# Patient Record
Sex: FEMALE | Race: WHITE | NOT HISPANIC OR LATINO | URBAN - METROPOLITAN AREA
[De-identification: names, ages, dates, MRNs, and addresses within clinical notes are randomized per-mention and may not be internally consistent; named-entity substitution may affect disease eponyms.]

---

## 2024-05-21 ENCOUNTER — LAB OUTSIDE AN ENCOUNTER (OUTPATIENT)
Dept: URBAN - METROPOLITAN AREA CLINIC 98 | Facility: CLINIC | Age: 44
End: 2024-05-21

## 2024-05-21 ENCOUNTER — OFFICE VISIT (OUTPATIENT)
Dept: URBAN - METROPOLITAN AREA CLINIC 98 | Facility: CLINIC | Age: 44
End: 2024-05-21

## 2024-05-21 ENCOUNTER — DASHBOARD ENCOUNTERS (OUTPATIENT)
Age: 44
End: 2024-05-21

## 2024-05-21 VITALS
HEART RATE: 65 BPM | WEIGHT: 212.8 LBS | TEMPERATURE: 97.1 F | DIASTOLIC BLOOD PRESSURE: 64 MMHG | BODY MASS INDEX: 35.45 KG/M2 | HEIGHT: 65 IN | SYSTOLIC BLOOD PRESSURE: 104 MMHG

## 2024-05-21 PROBLEM — 197321007: Status: ACTIVE | Noted: 2024-05-21

## 2024-05-21 PROBLEM — 162864005: Status: ACTIVE | Noted: 2024-05-21

## 2024-05-21 PROBLEM — 722596001: Status: ACTIVE | Noted: 2024-05-21

## 2024-05-21 RX ORDER — POLYETHYLENE GLYCOL 3350, SODIUM CHLORIDE, SODIUM BICARBONATE, POTASSIUM CHLORIDE 420; 11.2; 5.72; 1.48 G/4L; G/4L; G/4L; G/4L
4000 ML POWDER, FOR SOLUTION ORAL AS DIRECTED
Qty: 4000 ML | Refills: 0 | OUTPATIENT
Start: 2024-05-21 | End: 2024-05-22

## 2024-05-24 LAB
A/G RATIO: 1.9
ACTIN (SMOOTH MUSCLE) ANTIBODY: 4
AFP, SERUM, TUMOR MARKER: 9.6
ALBUMIN: 4.9
ALKALINE PHOSPHATASE: 52
ALT (SGPT): 21
ANA DIRECT: NEGATIVE
ANTINUCLEAR ANTIBODIES, IFA: NEGATIVE
AST (SGOT): 20
BASO (ABSOLUTE): 0.1
BASOS: 1
BILIRUBIN, TOTAL: 0.3
BUN/CREATININE RATIO: 17
BUN: 13
CALCIUM: 9.8
CARBON DIOXIDE, TOTAL: 19
CHLORIDE: 101
CHOLESTEROL, TOTAL: 253
COMMENT:: (no result)
CREATININE: 0.75
EGFR: 100
EOS (ABSOLUTE): 0.1
EOS: 1
FERRITIN, SERUM: 119
FIB-4 INDEX: 0.65
GLOBULIN, TOTAL: 2.6
GLUCOSE: 108
HBSAG SCREEN: NEGATIVE
HDL CHOLESTEROL: 57
HEMATOCRIT: 43.2
HEMATOLOGY COMMENTS:: (no result)
HEMOGLOBIN: 14.4
HEP A AB, IGM: NEGATIVE
HEP A AB, TOTAL: POSITIVE
HEPATITIS B SURF AB QUANT: <3.5
IMMATURE CELLS: (no result)
IMMATURE GRANS (ABS): 0
IMMATURE GRANULOCYTES: 0
IMMUNOGLOBULIN A, QN, SERUM: 196
IMMUNOGLOBULIN E, TOTAL: 17
IMMUNOGLOBULIN G, QN, SERUM: 1106
IMMUNOGLOBULIN M, QN, SERUM: 175
INR: 1
IRON BIND.CAP.(TIBC): 349
IRON SATURATION: 26
IRON: 92
LDL CHOL CALC (NIH): 151
LYMPHS (ABSOLUTE): 3.6
LYMPHS: 43
MCH: 31.6
MCHC: 33.3
MCV: 95
MITOCHONDRIAL (M2) ANTIBODY: <20
MONOCYTES(ABSOLUTE): 0.4
MONOCYTES: 5
NEUTROPHILS (ABSOLUTE): 4.3
NEUTROPHILS: 50
NRBC: (no result)
PLATELETS: 302
POTASSIUM: 4.1
PROTEIN, TOTAL: 7.5
PROTHROMBIN TIME: 10.1
RBC: 4.55
RDW: 12.9
SODIUM: 135
TRIGLYCERIDES: 249
UIBC: 257
VLDL CHOLESTEROL CAL: 45
WBC: 8.4

## 2024-06-02 ENCOUNTER — LAB OUTSIDE AN ENCOUNTER (OUTPATIENT)
Dept: URBAN - METROPOLITAN AREA CLINIC 98 | Facility: CLINIC | Age: 44
End: 2024-06-02

## 2024-06-02 ENCOUNTER — TELEPHONE ENCOUNTER (OUTPATIENT)
Dept: URBAN - METROPOLITAN AREA CLINIC 98 | Facility: CLINIC | Age: 44
End: 2024-06-02

## 2024-06-06 ENCOUNTER — LAB OUTSIDE AN ENCOUNTER (OUTPATIENT)
Dept: URBAN - METROPOLITAN AREA CLINIC 98 | Facility: CLINIC | Age: 44
End: 2024-06-06

## 2024-06-17 ENCOUNTER — TELEPHONE ENCOUNTER (OUTPATIENT)
Dept: URBAN - METROPOLITAN AREA CLINIC 98 | Facility: CLINIC | Age: 44
End: 2024-06-17

## 2024-07-17 ENCOUNTER — CLAIMS CREATED FROM THE CLAIM WINDOW (OUTPATIENT)
Dept: URBAN - METROPOLITAN AREA SURGERY CENTER 18 | Facility: SURGERY CENTER | Age: 44
End: 2024-07-17
Payer: COMMERCIAL

## 2024-07-17 DIAGNOSIS — K64.8 INTERNAL HEMORRHOIDS: ICD-10-CM

## 2024-07-17 DIAGNOSIS — Z12.11 COLON CANCER SCREENING: ICD-10-CM

## 2024-07-17 DIAGNOSIS — Z80.0 FAMILY HISTORY OF COLON CANCER: ICD-10-CM

## 2024-07-17 DIAGNOSIS — Z80.0 FAMILY HISTORY OF MALIGNANT NEOPLASM OF DIGESTIVE ORGANS: ICD-10-CM

## 2024-07-17 PROCEDURE — G0105 COLORECTAL SCRN; HI RISK IND: HCPCS | Performed by: INTERNAL MEDICINE

## 2024-07-17 PROCEDURE — 00811 ANES LWR INTST NDSC NOS: CPT | Performed by: NURSE ANESTHETIST, CERTIFIED REGISTERED

## 2024-08-19 ENCOUNTER — OFFICE VISIT (OUTPATIENT)
Dept: URBAN - METROPOLITAN AREA CLINIC 98 | Facility: CLINIC | Age: 44
End: 2024-08-19

## 2024-11-01 ENCOUNTER — OFFICE VISIT (OUTPATIENT)
Dept: URBAN - METROPOLITAN AREA CLINIC 98 | Facility: CLINIC | Age: 44
End: 2024-11-01
Payer: COMMERCIAL

## 2024-11-01 VITALS
DIASTOLIC BLOOD PRESSURE: 73 MMHG | SYSTOLIC BLOOD PRESSURE: 103 MMHG | HEART RATE: 69 BPM | BODY MASS INDEX: 34.16 KG/M2 | WEIGHT: 205 LBS | TEMPERATURE: 96.6 F | HEIGHT: 65 IN

## 2024-11-01 DIAGNOSIS — E66.09 OTHER OBESITY DUE TO EXCESS CALORIES: ICD-10-CM

## 2024-11-01 DIAGNOSIS — R16.0 HEPATOMEGALY: ICD-10-CM

## 2024-11-01 DIAGNOSIS — Z80.0 FAMILY HISTORY OF COLON CANCER IN FATHER: ICD-10-CM

## 2024-11-01 DIAGNOSIS — K76.0 HEPATIC STEATOSIS: ICD-10-CM

## 2024-11-01 DIAGNOSIS — Z12.11 SCREEN FOR COLON CANCER: ICD-10-CM

## 2024-11-01 DIAGNOSIS — R77.2 ELEVATED AFP: ICD-10-CM

## 2024-11-01 PROCEDURE — 99213 OFFICE O/P EST LOW 20 MIN: CPT

## 2024-11-01 NOTE — HPI-TODAY'S VISIT:
Visit 5/21/24- Petra Sevilla, NP - Here to discuss u/s hepatomegaly - PCP Dr. Debora Broderickreferred pt. to Dr. Hunter; progress note will be sent following visit - Started Wegovy for the past 4 months for weight loss - Incidental finding on CT of chest enlarged liver - No family history of liver disease - No supplements or herbs - Takes ibuprofen now 400 mg 3 times a week ; was taking 400 mg daily all of 2023; 4 months of that 1600 mg daily - Colonoscopy 6/2023 11/1/24- Petra Sevlila NP - 45 yo female here to follow-up hepatomegaly and fatty liver - Since last visit was layed off from work on 5/30- has a new job with higher stress - Still on Wegovy with 25 pound weight loss since March 2024 - Has not been able to exercise as much with new job - Diet has improved, more healthy- lean meats and veg. -6/16/24 MRI elastography- F0 liver fibrosis. Mild hepatic steatosis with fat signal 10.7% - Colonoscopy 7/17/24 with Dr. Hunter- REPORT NOT IN CHART

## 2024-11-02 LAB
AFP, SERUM, TUMOR MARKER: 11.2
ALBUMIN: 4.9
ALKALINE PHOSPHATASE: 40
ALT (SGPT): 17
AST (SGOT): 17
BASO (ABSOLUTE): 0.1
BASOS: 1
BILIRUBIN, TOTAL: 0.5
BUN/CREATININE RATIO: 16
BUN: 13
CALCIUM: 9.6
CARBON DIOXIDE, TOTAL: 22
CHLORIDE: 101
CHOLESTEROL, TOTAL: 219
CREATININE: 0.82
EGFR: 89
EOS (ABSOLUTE): 0.1
EOS: 2
GLOBULIN, TOTAL: 2.4
GLUCOSE: 76
HDL CHOLESTEROL: 59
HEMATOCRIT: 41.8
HEMATOLOGY COMMENTS:: (no result)
HEMOGLOBIN: 14
IMMATURE CELLS: (no result)
IMMATURE GRANS (ABS): 0
IMMATURE GRANULOCYTES: 0
LDL CALC COMMENT:: (no result)
LDL CHOL CALC (NIH): 142
LYMPHS (ABSOLUTE): 3.5
LYMPHS: 45
MCH: 31.8
MCHC: 33.5
MCV: 95
MONOCYTES(ABSOLUTE): 0.6
MONOCYTES: 7
NEUTROPHILS (ABSOLUTE): 3.4
NEUTROPHILS: 45
NRBC: (no result)
PLATELETS: 275
POTASSIUM: 4.7
PROTEIN, TOTAL: 7.3
RBC: 4.4
RDW: 12.1
SODIUM: 137
TRIGLYCERIDES: 102
VLDL CHOLESTEROL CAL: 18
WBC: 7.6

## 2024-11-07 ENCOUNTER — LAB OUTSIDE AN ENCOUNTER (OUTPATIENT)
Dept: URBAN - METROPOLITAN AREA CLINIC 3 | Facility: CLINIC | Age: 44
End: 2024-11-07

## 2024-11-07 ENCOUNTER — TELEPHONE ENCOUNTER (OUTPATIENT)
Dept: URBAN - METROPOLITAN AREA CLINIC 3 | Facility: CLINIC | Age: 44
End: 2024-11-07

## 2025-05-01 ENCOUNTER — LAB OUTSIDE AN ENCOUNTER (OUTPATIENT)
Dept: URBAN - METROPOLITAN AREA CLINIC 98 | Facility: CLINIC | Age: 45
End: 2025-05-01

## 2025-05-02 ENCOUNTER — OFFICE VISIT (OUTPATIENT)
Dept: URBAN - METROPOLITAN AREA CLINIC 98 | Facility: CLINIC | Age: 45
End: 2025-05-02

## 2025-05-02 DIAGNOSIS — E66.09 OTHER OBESITY DUE TO EXCESS CALORIES: ICD-10-CM

## 2025-05-02 DIAGNOSIS — R77.2 ELEVATED AFP: ICD-10-CM

## 2025-05-02 DIAGNOSIS — K76.89 HEPATIC CYST: ICD-10-CM

## 2025-05-02 DIAGNOSIS — Z12.11 SCREEN FOR COLON CANCER: ICD-10-CM

## 2025-05-02 DIAGNOSIS — R16.0 HEPATOMEGALY: ICD-10-CM

## 2025-05-02 DIAGNOSIS — K76.0 HEPATIC STEATOSIS: ICD-10-CM

## 2025-05-02 DIAGNOSIS — Z80.0 FAMILY HISTORY OF COLON CANCER IN FATHER: ICD-10-CM

## 2025-05-02 PROCEDURE — 99213 OFFICE O/P EST LOW 20 MIN: CPT

## 2025-05-02 NOTE — HPI-TODAY'S VISIT:
5/2/2025- Petra Sevilla, NP - 43 yo female here to follow-up fatty liver - Has been able to increase exercise: weight lifting 2 times per week, walking 3 times per week - Still on wegovy with 3 pound weight loss since last visit; now 201 - Diet is good lean meats and veg. - Colonoscopy 7/17/24- no polyps. repeat 5 years father with colon cancer - MRI with Prenuvo scan 11/26/2024- liver with simple cyst, largest 1.5 cm,  normal pancreas

## 2025-05-02 NOTE — HPI-OTHER HISTORIES
Visit 5/21/24- Petra Sevilla, NP - Here to discuss u/s hepatomegaly - PCP Dr. Debora Broderickreferred pt. to Dr. Hunter; progress note will be sent following visit - Started Wegovy for the past 4 months for weight loss - Incidental finding on CT of chest enlarged liver - No family history of liver disease - No supplements or herbs - Takes ibuprofen now 400 mg 3 times a week ; was taking 400 mg daily all of 2023; 4 months of that 1600 mg daily - Colonoscopy 6/2023 11/1/24- Petra Sevilla NP - 43 yo female here to follow-up hepatomegaly and fatty liver - Since last visit was layed off from work on 5/30- has a new job with higher stress - Still on Wegovy with 25 pound weight loss since March 2024 - Has not been able to exercise as much with new job - Diet has improved, more healthy- lean meats and veg. -6/16/24 MRI elastography- F0 liver fibrosis. Mild hepatic steatosis with fat signal 10.7% - Colonoscopy 7/17/24 with Dr. Hunter- REPORT NOT IN CHART

## 2025-05-20 PROBLEM — 85057007: Status: ACTIVE | Noted: 2025-05-02

## 2025-05-31 LAB
AFP, SERUM, TUMOR MARKER: 8.4
ALBUMIN: 4.7
ALKALINE PHOSPHATASE: 51
ALT (SGPT): 14
AST (SGOT): 20
BASO (ABSOLUTE): 0.1
BASOS: 1
BILIRUBIN, TOTAL: 0.5
BUN/CREATININE RATIO: 15
BUN: 13
CALCIUM: 9.7
CARBON DIOXIDE, TOTAL: 21
CHLORIDE: 104
CREATININE: 0.85
EGFR: 86
EOS (ABSOLUTE): 0.2
EOS: 3
GLOBULIN, TOTAL: 2.4
GLUCOSE: 80
HEMATOCRIT: 39.6
HEMATOLOGY COMMENTS:: (no result)
HEMOGLOBIN: 13.2
IMMATURE CELLS: (no result)
IMMATURE GRANS (ABS): 0
IMMATURE GRANULOCYTES: 0
LYMPHS (ABSOLUTE): 2.7
LYMPHS: 50
MCH: 30.6
MCHC: 33.3
MCV: 92
MONOCYTES(ABSOLUTE): 0.4
MONOCYTES: 7
NEUTROPHILS (ABSOLUTE): 2.1
NEUTROPHILS: 39
NRBC: (no result)
PLATELETS: 246
POTASSIUM: 4
PROTEIN, TOTAL: 7.1
RBC: 4.31
RDW: 11.9
SODIUM: 139
WBC: 5.3

## 2025-06-11 ENCOUNTER — TELEPHONE ENCOUNTER (OUTPATIENT)
Dept: URBAN - METROPOLITAN AREA CLINIC 3 | Facility: CLINIC | Age: 45
End: 2025-06-11

## 2025-08-04 ENCOUNTER — WEB ENCOUNTER (OUTPATIENT)
Dept: URBAN - METROPOLITAN AREA CLINIC 98 | Facility: CLINIC | Age: 45
End: 2025-08-04

## 2025-08-19 ENCOUNTER — WEB ENCOUNTER (OUTPATIENT)
Dept: URBAN - METROPOLITAN AREA CLINIC 98 | Facility: CLINIC | Age: 45
End: 2025-08-19

## 2025-08-22 ENCOUNTER — WEB ENCOUNTER (OUTPATIENT)
Dept: URBAN - METROPOLITAN AREA CLINIC 98 | Facility: CLINIC | Age: 45
End: 2025-08-22

## 2025-08-22 ENCOUNTER — LAB OUTSIDE AN ENCOUNTER (OUTPATIENT)
Dept: URBAN - METROPOLITAN AREA CLINIC 98 | Facility: CLINIC | Age: 45
End: 2025-08-22

## 2025-08-26 ENCOUNTER — WEB ENCOUNTER (OUTPATIENT)
Dept: URBAN - METROPOLITAN AREA CLINIC 98 | Facility: CLINIC | Age: 45
End: 2025-08-26

## 2025-08-28 ENCOUNTER — WEB ENCOUNTER (OUTPATIENT)
Dept: URBAN - METROPOLITAN AREA CLINIC 98 | Facility: CLINIC | Age: 45
End: 2025-08-28